# Patient Record
Sex: FEMALE | Race: WHITE | NOT HISPANIC OR LATINO | URBAN - METROPOLITAN AREA
[De-identification: names, ages, dates, MRNs, and addresses within clinical notes are randomized per-mention and may not be internally consistent; named-entity substitution may affect disease eponyms.]

---

## 2019-02-06 ENCOUNTER — OFFICE VISIT (OUTPATIENT)
Dept: URBAN - METROPOLITAN AREA CLINIC 17 | Facility: CLINIC | Age: 79
End: 2019-02-06
Payer: COMMERCIAL

## 2019-02-06 DIAGNOSIS — H47.293 OTHER OPTIC ATROPHY, BILATERAL: ICD-10-CM

## 2019-02-06 DIAGNOSIS — H53.453 OTHER LOCALIZED VISUAL FIELD DEFECT, BILATERAL: Primary | ICD-10-CM

## 2019-02-06 PROCEDURE — 99214 OFFICE O/P EST MOD 30 MIN: CPT | Performed by: OPTOMETRIST

## 2019-02-06 PROCEDURE — 92133 CPTRZD OPH DX IMG PST SGM ON: CPT | Performed by: OPTOMETRIST

## 2019-02-06 PROCEDURE — 92083 EXTENDED VISUAL FIELD XM: CPT | Performed by: OPTOMETRIST

## 2019-02-06 ASSESSMENT — INTRAOCULAR PRESSURE
OS: 12
OD: 11

## 2019-02-06 NOTE — IMPRESSION/PLAN
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 Plan: Patient had MRI obtained last week and will have further evaluation with neuro-ophthalmologists. Patient will contact office and determine if she needs to be seen again. 
OCT- 86/87

## 2019-02-06 NOTE — IMPRESSION/PLAN
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 Reviewed with patient. Will continue to monitor with yearly exams VF- OD sup inf arc OS sup inf arc BJW min. changes OU does not look glaucomatous OU

## 2020-03-02 ENCOUNTER — OFFICE VISIT (OUTPATIENT)
Dept: URBAN - METROPOLITAN AREA CLINIC 17 | Facility: CLINIC | Age: 80
End: 2020-03-02
Payer: COMMERCIAL

## 2020-03-02 DIAGNOSIS — H04.123 DRY EYE SYNDROME OF BILATERAL LACRIMAL GLANDS: ICD-10-CM

## 2020-03-02 DIAGNOSIS — Z96.1 PRESENCE OF INTRAOCULAR LENS: ICD-10-CM

## 2020-03-02 DIAGNOSIS — H43.813 VITREOUS DEGENERATION, BILATERAL: ICD-10-CM

## 2020-03-02 DIAGNOSIS — E11.9 TYPE 2 DIABETES MELLITUS W/O COMPLICATION: Primary | ICD-10-CM

## 2020-03-02 DIAGNOSIS — H47.292 OTHER OPTIC ATROPHY, LEFT EYE: ICD-10-CM

## 2020-03-02 PROCEDURE — 92133 CPTRZD OPH DX IMG PST SGM ON: CPT | Performed by: OPTOMETRIST

## 2020-03-02 PROCEDURE — 92014 COMPRE OPH EXAM EST PT 1/>: CPT | Performed by: OPTOMETRIST

## 2020-03-02 ASSESSMENT — INTRAOCULAR PRESSURE
OS: 9
OD: 9

## 2020-03-02 NOTE — IMPRESSION/PLAN
Impression: Type 2 diabetes mellitus w/o complication: W96.0. OS. Plan: Diabetes type II: no background retinopathy, no signs of neovascularization noted. Discussed ocular and systemic benefits of blood sugar control.

## 2020-03-02 NOTE — IMPRESSION/PLAN
Impression: Dry eye syndrome of bilateral lacrimal glands: H04.123. Plan: Dry eyes account for the patient's complaints. There is no evidence of permanent changes to the cornea. Explained condition does not have a cure and will need artificial tears for maintenance 4 times daily.

## 2020-03-02 NOTE — IMPRESSION/PLAN
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 Plan: Discussed diagnosis in detail with patient. No treatment is required at this time. Reassured patient of current condition and treatment. Will continue to observe condition and or symptoms.